# Patient Record
Sex: MALE | Race: WHITE | NOT HISPANIC OR LATINO | URBAN - METROPOLITAN AREA
[De-identification: names, ages, dates, MRNs, and addresses within clinical notes are randomized per-mention and may not be internally consistent; named-entity substitution may affect disease eponyms.]

---

## 2022-02-18 DIAGNOSIS — M51.16 LUMBAR DISC HERNIATION WITH RADICULOPATHY: Primary | ICD-10-CM

## 2022-02-18 DIAGNOSIS — M54.16 LUMBAR RADICULOPATHY: ICD-10-CM

## 2022-02-21 ENCOUNTER — CLINICAL SUPPORT (OUTPATIENT)
Dept: REHABILITATION | Facility: HOSPITAL | Age: 32
End: 2022-02-21
Payer: COMMERCIAL

## 2022-02-21 DIAGNOSIS — M54.50 LUMBAR PAIN WITH RADIATION DOWN RIGHT LEG: ICD-10-CM

## 2022-02-21 DIAGNOSIS — M79.604 LUMBAR PAIN WITH RADIATION DOWN RIGHT LEG: ICD-10-CM

## 2022-02-21 PROCEDURE — 97161 PT EVAL LOW COMPLEX 20 MIN: CPT | Mod: PN | Performed by: PHYSICAL THERAPIST

## 2022-02-21 PROCEDURE — 97110 THERAPEUTIC EXERCISES: CPT | Mod: PN | Performed by: PHYSICAL THERAPIST

## 2022-02-21 NOTE — PLAN OF CARE
[facility]  Physical Therapy Initial Evaluation    Date: 2/21/2022   Name: Shay Correa  Clinic Number: [id number]    Therapy Diagnosis:   Encounter Diagnosis   Name Primary?    Lumbar pain with radiation down right leg      Physician: Cezar Almonte MD    Physician Orders: PT Eval and Treat   Medical Diagnosis from Referral: Lumbar disc herniation with radiculopathy  Evaluation Date: 2/21/2022  Authorization Period Expiration: 12/31/2022  Plan of Care Expiration: 04/05/22  Progress Note Due: 03/21/22  Visit # / Visits authorized: 1/ 1   FOTO: 1/ 3     Precautions: Standard    Time In: 4:40 AM  Time Out: 5:25 AM  Total Appointment Time (timed & untimed codes): 45 minutes    Subjective   Date of onset: August 2021  History of current condition - Shay reports: An onset of of right lateral thigh and calf pain that started in August of 2021. He was seen by a chiropractor, but did not have a reduction in symptoms. He had an MRI that showed a disc herniation at L4-5. His pain is intermittent in nature and is improved with activity. His pain is provoked by position; sitting is worse then standing. He also has intermittent numbness and tingling in the lateral thigh and posterior calf.        No past medical history on file.  Shay Correa  has no past surgical history on file.    Shay currently has no medications in their medication list.    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   Prior Level of Function: No limitations related to back pain   Current Level of Function: Able to complete ADL's but with intermittent pain     Pain:  Current 2/10, worst 9/10, best 0/10   Location: right back  and right leg    Description: Sharp and muscle cramp   Aggravating Factors:  Sitting and positional   Easing Factors: walking, changing position     Pts goals: To decrease back pain       Objective   Red Flag Screening:   Cough  Sneeze  Strain: (+)  Bladder/ bowel: (--)  Falls: (--)  Night pain: (--)  Unexplained weight loss: (--)  General health: Good     Posture/ Alignment: Fair, T/L hinging     GAIT DEVIATIONS: Shay rawls with with normal gait mechanics .    ROM:   ROM:   AROM  Comment   Flexion: Moderate loss      Extension: WNL      Lat Flex R: WNL      Lat Flex L: WNL     Rotation R: WNL     Rotation L: WNL     *pain    Strength: Dermatomes:   Right Left Comment   L2 (lateral thigh) intact intact    L3 (medial thigh) intact intact    L4 (medial calf) intact intact    L5 (lateral calf) intact intact    S1 (lateral foot) intact intact    S2 (gastro/HS) intact intact    Saddle (cauda equina) intact intact      Myotomes:   Right Left Comment   Iliacus: (L2-3) 5/5 5/5    Knee extension (L3-4): 5/5 5/5    DF (L4-5): 5/5 5/5    Extensor digitorum longus, Peroneus(L5-S1): 5/5 5/5    Gastroc/Soleus(S1-S2): 5/5 5/5      DTR:   Right Left Comment   Patellar (L3-4) 2+ 2+    Achilles (S1) 2+ 2+        Special Tests:  REPEATED TEST MOVEMENTS:  Repeated Flexion in Standing Increased, no worse    Repeated Extension in Standing Increased, No worse    Repeated Flexion in lying NT   Repeated Extension in lying  abolished     - AUGUSTINE (-)  - SLR:(+)    Palpation:  TTP @ L5     Pt/family was provided educational information, including: role of PT, goals for PT, scheduling - pt verbalized understanding. Discussed insurance plan with pt.       CMS Impairment/Limitation/Restriction for FOTO Lumbar  Survey    Therapist reviewed FOTO scores for Shay Correa on 2/21/2022.   FOTO documents entered into [employer] - see Media section.    Limitation Score: 35%  Category: Mobility    Current : CJ = at least 20% but < 40% impaired, limited or restricted         TREATMENT   Treatment Time In: 5:15 PM   Treatment Time  Out: 5:25 PM   Total Treatment time separate from Evaluation: 10 minutes    Shay received therapeutic exercises to develop ROM for 10 minutes including:  HEP setup and instruction; handout issued   Prone press ups 2 x 10     Home Exercises and Patient Education Provided    Education provided:   - Pathophysiology of condition   - HEP   - POC   - Minimizing flexion based activities     Written Home Exercises Provided: yes.  Exercises were reviewed and Shay was able to demonstrate them prior to the end of the session.  Shay demonstrated good  understanding of the education provided.     See EMR under Patient Instructions for exercises provided 2/21/2022.      Assessment   Pt presents with a medical diagnosis of lumbar radiculopathy. Physical exam is consistent with mechanical lumbar pain and more specifically a lumbar derangement. His preliminary directional preference is extension. Primary impairments include AROM, joint mobility, strength,and pain which limits functional mobility. This pt is a good candidate for skilled PT tx and stands to benefit from a combination of manual therapy, therapeutic exercise, neuromuscular re-education, dry needling and modalities Prn. The pt has been educated on their dx/POC and consents to further PT tx.      Pt prognosis is Excellent.   Pt will benefit from skilled outpatient Physical Therapy to address the deficits stated above and in the chart below, provide pt/family education, and to maximize pt's level of independence.     Plan of care discussed with patient: Yes  Pt's spiritual, cultural and educational needs considered and patient is agreeable to the plan of care and goals as stated below:     Anticipated Barriers for therapy: Work schedule     Medical Necessity is demonstrated by the following  History  Co-morbidities and personal factors that may impact the plan of care Co-morbidities:   high BMI and HTN    Personal Factors:   lifestyle     moderate    Examination  Body Structures and Functions, activity limitations and participation restrictions that may impact the plan of care Body Regions:   back  lower extremities    Body Systems:    ROM  strength  motor control    Participation Restrictions:       Activity limitations:   Learning and applying knowledge  no deficits    General Tasks and Commands  no deficits    Communication  no deficits    Mobility  lifting and carrying objects    Self care  no deficits    Domestic Life  doing house work (cleaning house, washing dishes, laundry)    Interactions/Relationships  no deficits    Life Areas  no deficits    Community and Social Life  recreation and leisure         high   Clinical Presentation stable and uncomplicated low   Decision Making/ Complexity Score: low     Goals:  Short Term Goals: 3 weeks   1) Pt will be I with established HEP   2) Right LE pain will be resolved to allow for improved ease of ADL and exercise related activity   3) Lumbar pain will be no worse then 3/10     Long Term Goals: 6 weeks   1) Pt will return to exercise activities without limitations related to lumbar pain   2) Pt will perform all ADL activities without lumbar pain   3) Pt will have 10% or less limitation on the FOTO        Plan   Plan of care Certification: 2/21/2022 to 04/05/22.    Outpatient Physical Therapy 2 times weekly for 6 weeks to include the following interventions: Manual Therapy, Neuromuscular Re-ed, Patient Education, Therapeutic Exercise and dry needling .     Rishi Us, PT      I CERTIFY THE NEED FOR THESE SERVICES FURNISHED UNDER THIS PLAN OF TREATMENT AND WHILE UNDER MY CARE   Physician's comments:     Physician's Signature: ___________________________________________________

## 2022-02-22 PROBLEM — M79.604 LUMBAR PAIN WITH RADIATION DOWN RIGHT LEG: Status: ACTIVE | Noted: 2022-02-22

## 2022-02-22 PROBLEM — M54.50 LUMBAR PAIN WITH RADIATION DOWN RIGHT LEG: Status: ACTIVE | Noted: 2022-02-22

## 2022-02-23 NOTE — PROGRESS NOTES
Physical Therapy Daily Treatment Note     Name: Shay Correa  Clinic Number: [id number]    Therapy Diagnosis:   Encounter Diagnosis   Name Primary?    Lumbar pain with radiation down right leg Yes     Physician: Cezar Almonte MD    Visit Date: 2/24/2022  Physician Orders: PT Eval and Treat   Medical Diagnosis from Referral: Lumbar disc herniation with radiculopathy  Evaluation Date: 2/21/2022  Authorization Period Expiration: 12/31/2022  Plan of Care Expiration: 04/05/22  Progress Note Due: 03/21/22  Visit # / Visits authorized: 1/20 auth (1/1 eval)   FOTO: 1/ 3      Precautions: Standard     Time In: 1246 PM  Time Out: 0130 PM  Total Appointment Time (timed & untimed codes): 44 minutes    Subjective     Pt reports: he has noticed improvements with light exercises including prone press ups and recreational walking. He is only experiencing minimal radicular symptoms into right LE.     He was compliant with home exercise program.  Response to previous treatment: initial evaluation   Functional change: increased mobility    Pain: 2/10  Location: right back and right leg    Objective     Shay received therapeutic exercises to develop strength, endurance, ROM, flexibility, posture and core stabilization for 34 minutes including:    Upright bike 5 min  Long sitting hamstring stretch 2 min  LE dead bugs 2x10  Bridging x30  Planking 4x10 sec  Straight arm lat pulldown 3x10 20#  Seated lat pulldown 3x10 27#      Shay received the following manual therapy techniques: Joint mobilizations and Manual traction were applied to the: lumbar spine for 10 minutes, including:    Grade 3-4 lumbar PA mobilizations, pt in prone position  Manual traction, pt in supine, BLE      Possible next visit: manual techniques including joint mobilizations and dry needling, stabilization       Home Exercises Provided and Patient Education Provided     Education provided:   - HEP provided and reviewed  - Anatomy and Physiology  pertaining to current condition  - Possible response to exercise  - Importance of PT compliance    Written Home Exercises Provided: yes.  Exercises were reviewed and Shay was able to demonstrate them prior to the end of the session.  Shay demonstrated good  understanding of the education provided.      See EMR under Patient Instructions for exercises provided 2/21/2022.    Assessment     Pt tolerates all additions to therex with emphasis on lumbar stabilization, LE flexibility, and core strengthening. Pt notes positive response with improved mobility and slightly reduced symptoms following manual therapy. He would benefit from further progression and continuance.     Shay Is progressing well towards his goals.   Pt prognosis is Excellent.     Pt will continue to benefit from skilled outpatient physical therapy to address the deficits listed in the problem list box on initial evaluation, provide pt/family education and to maximize pt's level of independence in the home and community environment.     Pt's spiritual, cultural and educational needs considered and pt agreeable to plan of care and goals.    Anticipated barriers to physical therapy: none    Goals:  Short Term Goals: 3 weeks   1) Pt will be I with established HEP. (met)  2) Right LE pain will be resolved to allow for improved ease of ADL and exercise related activity . (progressing, not met)  3) Lumbar pain will be no worse then 3/10. (progressing, not met)     Long Term Goals: 6 weeks   1) Pt will return to exercise activities without limitations related to lumbar pain. (progressing, not met)  2) Pt will perform all ADL activities without lumbar pain. (progressing, not met)  3) Pt will have 10% or less limitation on the FOTO. (progressing, not met)    Plan     Continue with PT POC to address functional limitations and symptoms presented.     Jim Banks, PT DPT

## 2022-02-24 ENCOUNTER — CLINICAL SUPPORT (OUTPATIENT)
Dept: REHABILITATION | Facility: HOSPITAL | Age: 32
End: 2022-02-24
Payer: COMMERCIAL

## 2022-02-24 DIAGNOSIS — M54.50 LUMBAR PAIN WITH RADIATION DOWN RIGHT LEG: Primary | ICD-10-CM

## 2022-02-24 DIAGNOSIS — M79.604 LUMBAR PAIN WITH RADIATION DOWN RIGHT LEG: Primary | ICD-10-CM

## 2022-02-24 PROCEDURE — 97110 THERAPEUTIC EXERCISES: CPT | Mod: PN

## 2022-02-24 PROCEDURE — 97140 MANUAL THERAPY 1/> REGIONS: CPT | Mod: PN

## 2022-03-02 ENCOUNTER — CLINICAL SUPPORT (OUTPATIENT)
Dept: REHABILITATION | Facility: HOSPITAL | Age: 32
End: 2022-03-02
Payer: COMMERCIAL

## 2022-03-02 DIAGNOSIS — M54.50 LUMBAR PAIN WITH RADIATION DOWN RIGHT LEG: Primary | ICD-10-CM

## 2022-03-02 DIAGNOSIS — M79.604 LUMBAR PAIN WITH RADIATION DOWN RIGHT LEG: Primary | ICD-10-CM

## 2022-03-02 PROCEDURE — 97140 MANUAL THERAPY 1/> REGIONS: CPT | Mod: PN

## 2022-03-02 PROCEDURE — 97110 THERAPEUTIC EXERCISES: CPT | Mod: PN

## 2022-03-02 NOTE — PROGRESS NOTES
Physical Therapy Daily Treatment Note     Name: Shay Correa  Clinic Number: [id number]    Therapy Diagnosis:   Encounter Diagnosis   Name Primary?    Lumbar pain with radiation down right leg Yes     Physician: Cezar Almonte MD    Visit Date: 3/2/2022  Physician Orders: PT Eval and Treat   Medical Diagnosis from Referral: Lumbar disc herniation with radiculopathy  Evaluation Date: 2/21/2022  Authorization Period Expiration: 12/31/2022  Plan of Care Expiration: 04/05/22  Progress Note Due: 03/21/22  Visit # / Visits authorized: 1/20 auth (1/1 eval)   FOTO: 1/ 3      Precautions: Standard     Time In: 0440  Time Out: 0515 PM  Total Appointment Time (timed & untimed codes): 35 minutes    Subjective     Pt reports: symptoms continue to improve.     He was compliant with home exercise program.  Response to previous treatment: initial evaluation   Functional change: increased mobility    Pain: 1/10  Location: right back and right leg    Objective     Shay received therapeutic exercises to develop strength, endurance, ROM, flexibility, posture and core stabilization for 25 minutes including:    Upright bike 5 min  Long sitting hamstring stretch 2 min  LE dead bugs 2x10  Bridging x30  Planking 4x10 sec  Straight arm lat pulldown 3x10 20#  Seated lat pulldown 3x10 27#  Open books x10 bilateral       Pt received Manual Therapy techniques in the form of Dry Needling for 10 minutes. This was applied to the low back right side. Dry needling was performed to decrease inflammation, increase circulation, decrease pain and restore homeostasis.     Electrical Stimulation was applied while needles were in situ for 5 minutes. Intensity increased to patient tolerance. No adverse reactions observed.      (4) 60 mm needles with 0.30 mm gauge were used for all insertion points. Pt in prone position.     Patient gave Verbal and Written consent to undergo dry needling. Written consent can be found in the media section in pts  chart. All needles were removed and changes in signs and symptoms were assessed. No adverse reactions noted at the conclusion of the treatment.             Possible next visit: manual techniques including joint mobilizations and dry needling, stabilization       Home Exercises Provided and Patient Education Provided     Education provided:   - HEP provided and reviewed  - Anatomy and Physiology pertaining to current condition  - Possible response to exercise  - Importance of PT compliance    Written Home Exercises Provided: yes.  Exercises were reviewed and Shay was able to demonstrate them prior to the end of the session.  Shay demonstrated good  understanding of the education provided.      See EMR under Patient Instructions for exercises provided 2/21/2022.    Assessment     Pt tolerates open book exercise for back mobility specifically more in thoracic area.  Dry needling performed to reduce discomfort and reduce radicular symptoms into LE. He notes overall positive response immediately following treatment. Continue PT.     Shay Is progressing well towards his goals.   Pt prognosis is Excellent.     Pt will continue to benefit from skilled outpatient physical therapy to address the deficits listed in the problem list box on initial evaluation, provide pt/family education and to maximize pt's level of independence in the home and community environment.     Pt's spiritual, cultural and educational needs considered and pt agreeable to plan of care and goals.    Anticipated barriers to physical therapy: none    Goals:  Short Term Goals: 3 weeks   1) Pt will be I with established HEP. (met)  2) Right LE pain will be resolved to allow for improved ease of ADL and exercise related activity . (progressing, not met)  3) Lumbar pain will be no worse then 3/10. (progressing, not met)     Long Term Goals: 6 weeks   1) Pt will return to exercise activities without limitations related to lumbar pain. (progressing,  not met)  2) Pt will perform all ADL activities without lumbar pain. (progressing, not met)  3) Pt will have 10% or less limitation on the FOTO. (progressing, not met)    Plan     Continue with PT POC to address functional limitations and symptoms presented.     Jim Banks, PT DPT

## 2022-03-03 ENCOUNTER — CLINICAL SUPPORT (OUTPATIENT)
Dept: REHABILITATION | Facility: HOSPITAL | Age: 32
End: 2022-03-03
Payer: COMMERCIAL

## 2022-03-03 DIAGNOSIS — M54.50 LUMBAR PAIN WITH RADIATION DOWN RIGHT LEG: Primary | ICD-10-CM

## 2022-03-03 DIAGNOSIS — M79.604 LUMBAR PAIN WITH RADIATION DOWN RIGHT LEG: Primary | ICD-10-CM

## 2022-03-03 PROCEDURE — 97110 THERAPEUTIC EXERCISES: CPT | Mod: PN | Performed by: PHYSICAL THERAPIST

## 2022-03-03 NOTE — PROGRESS NOTES
Physical Therapy Daily Treatment Note     Name: Shay Correa  Clinic Number: [id number]    Therapy Diagnosis:   Encounter Diagnosis   Name Primary?    Lumbar pain with radiation down right leg Yes     Physician: Cezar Almonte MD    Visit Date: 3/3/2022  Physician Orders: PT Eval and Treat   Medical Diagnosis from Referral: Lumbar disc herniation with radiculopathy  Evaluation Date: 2/21/2022  Authorization Period Expiration: 12/31/2022  Plan of Care Expiration: 04/05/22  Progress Note Due: 03/21/22  Visit # / Visits authorized: 3/20 auth (1/1 eval)   FOTO: 1/ 3      Precautions: Standard     Time In: 7:10 AM  Time Out: 7:50 AM  Total Appointment Time (timed & untimed codes): 40 minutes    Subjective     Pt reports: that he had an increase in pain after attending [employer] last night, but is better this morning. I am having some pain in my upper back and between my shoulder blades     He was compliant with home exercise program.  Response to previous treatment: initial evaluation   Functional change: increased mobility    Pain: 1/10  Location: right back and right leg    Objective     Shay received therapeutic exercises to develop strength, endurance, ROM, flexibility, posture and core stabilization for 40 minutes including:    Upright bike 5 min  Long sitting hamstring stretch 2 min  Prone press ups 2 x 10   LE dead bugs 2x10  SL Bridging x30  Planking 4x10 sec  Straight arm lat pulldown 3x10 20#  Seated lat pulldown 3x10 27#  Open books x10 bilateral   Clamshells 3 x 10 B red theraband     Pt received Manual Therapy techniques in the form of Dry Needling for  minutes. This was applied to the low back right side. Dry needling was performed to decrease inflammation, increase circulation, decrease pain and restore homeostasis.     Electrical Stimulation was applied while needles were in situ for  minutes. Intensity increased to patient tolerance. No adverse reactions observed.      (4) 60 mm needles with  0.30 mm gauge were used for all insertion points. Pt in prone position.           Possible next visit: manual techniques including joint mobilizations and dry needling, stabilization       Home Exercises Provided and Patient Education Provided     Education provided:   - HEP provided and reviewed  - Anatomy and Physiology pertaining to current condition  - Possible response to exercise  - Importance of PT compliance    Written Home Exercises Provided: yes.  Exercises were reviewed and Shay was able to demonstrate them prior to the end of the session.  Shay demonstrated good  understanding of the education provided.      See EMR under Patient Instructions for exercises provided 2/21/2022.    Assessment     Pt with exacerbation of symptoms likely caused by repetitive sit to stand transitions. Otherwise, his symptoms have been reducing. Improved ease of lumbar extension in standing. He will benefit from continued performance of extension based exercises to assist in further reducing symptoms. Upper back and periscapular pain likely radiating from the neck.     Shay Is progressing well towards his goals.   Pt prognosis is Excellent.     Pt will continue to benefit from skilled outpatient physical therapy to address the deficits listed in the problem list box on initial evaluation, provide pt/family education and to maximize pt's level of independence in the home and community environment.     Pt's spiritual, cultural and educational needs considered and pt agreeable to plan of care and goals.    Anticipated barriers to physical therapy: none    Goals:  Short Term Goals: 3 weeks   1) Pt will be I with established HEP. (met)  2) Right LE pain will be resolved to allow for improved ease of ADL and exercise related activity . (progressing, not met)  3) Lumbar pain will be no worse then 3/10. (progressing, not met)     Long Term Goals: 6 weeks   1) Pt will return to exercise activities without limitations related to  lumbar pain. (progressing, not met)  2) Pt will perform all ADL activities without lumbar pain. (progressing, not met)  3) Pt will have 10% or less limitation on the FOTO. (progressing, not met)    Plan     Continue with PT POC to address functional limitations and symptoms presented.     Rishi Us, PT DPT

## 2022-03-07 ENCOUNTER — CLINICAL SUPPORT (OUTPATIENT)
Dept: REHABILITATION | Facility: HOSPITAL | Age: 32
End: 2022-03-07
Payer: COMMERCIAL

## 2022-03-07 DIAGNOSIS — M54.50 LUMBAR PAIN WITH RADIATION DOWN RIGHT LEG: Primary | ICD-10-CM

## 2022-03-07 DIAGNOSIS — M79.604 LUMBAR PAIN WITH RADIATION DOWN RIGHT LEG: Primary | ICD-10-CM

## 2022-03-07 PROCEDURE — 97110 THERAPEUTIC EXERCISES: CPT | Mod: PN

## 2022-03-07 NOTE — PROGRESS NOTES
Physical Therapy Daily Treatment Note     Name: Shay Correa  Clinic Number: [id number]    Therapy Diagnosis:   Encounter Diagnosis   Name Primary?    Lumbar pain with radiation down right leg Yes     Physician: Cezar Almonte MD    Visit Date: 3/7/2022  Physician Orders: PT Eval and Treat   Medical Diagnosis from Referral: Lumbar disc herniation with radiculopathy  Evaluation Date: 2/21/2022  Authorization Period Expiration: 12/31/2022  Plan of Care Expiration: 04/05/22  Progress Note Due: 03/21/22  Visit # / Visits authorized: 4/20 auth (1/1 eval)   FOTO: 1/ 3      Precautions: Standard     Time In: 0433  Time Out: 0512  Total Appointment Time (timed & untimed codes): 40 minutes    Subjective     Pt reports: he has felt better in recent days. He continues to progress his daily activities and exercises. He feels better when he is moving throughout the day.     He was compliant with home exercise program.  Response to previous treatment: initial evaluation   Functional change: increased mobility    Pain: 1/10  Location: right back and right leg    Objective     Shay received therapeutic exercises to develop strength, endurance, ROM, flexibility, posture and core stabilization for 40 minutes including:    Upright bike 5 min  Long sitting hamstring stretch 2 min  Cable rows 60#  Pallof press with cable 13# x20 each  Standing lumbar extension, against table x20 reps    LE dead bugs 2x10  SL Bridging x30  Planking 4x10 sec  Straight arm lat pulldown 3x10 20#  Seated lat pulldown 3x10 30#  Open books x10 bilateral   Clamshells 3 x 10 B red theraband             Possible next visit: traction      Home Exercises Provided and Patient Education Provided     Education provided:   - HEP provided and reviewed  - Anatomy and Physiology pertaining to current condition  - Possible response to exercise  - Importance of PT compliance    Written Home Exercises Provided: yes.  Exercises were reviewed and Shay was able  to demonstrate them prior to the end of the session.  Shay demonstrated good  understanding of the education provided.      See EMR under Patient Instructions for exercises provided 2/21/2022.    Assessment     Pt tolerates therex progression including pallof press, cable rows, and standing lumbar extension. His symptoms continue to improve and he is making progress toward goals. Continue PT with possible mechanical traction next visit.    Shay Is progressing well towards his goals.   Pt prognosis is Excellent.     Pt will continue to benefit from skilled outpatient physical therapy to address the deficits listed in the problem list box on initial evaluation, provide pt/family education and to maximize pt's level of independence in the home and community environment.     Pt's spiritual, cultural and educational needs considered and pt agreeable to plan of care and goals.    Anticipated barriers to physical therapy: none    Goals:  Short Term Goals: 3 weeks   1) Pt will be I with established HEP. (met)  2) Right LE pain will be resolved to allow for improved ease of ADL and exercise related activity . (progressing, not met)  3) Lumbar pain will be no worse then 3/10. (progressing, not met)     Long Term Goals: 6 weeks   1) Pt will return to exercise activities without limitations related to lumbar pain. (progressing, not met)  2) Pt will perform all ADL activities without lumbar pain. (progressing, not met)  3) Pt will have 10% or less limitation on the FOTO. (progressing, not met)    Plan     Continue with PT POC to address functional limitations and symptoms presented.     Jim Banks, PT DPT

## 2022-03-09 ENCOUNTER — CLINICAL SUPPORT (OUTPATIENT)
Dept: REHABILITATION | Facility: HOSPITAL | Age: 32
End: 2022-03-09
Payer: COMMERCIAL

## 2022-03-09 DIAGNOSIS — M79.604 LUMBAR PAIN WITH RADIATION DOWN RIGHT LEG: Primary | ICD-10-CM

## 2022-03-09 DIAGNOSIS — M54.50 LUMBAR PAIN WITH RADIATION DOWN RIGHT LEG: Primary | ICD-10-CM

## 2022-03-09 PROCEDURE — 97012 MECHANICAL TRACTION THERAPY: CPT | Mod: PN

## 2022-03-09 PROCEDURE — 97110 THERAPEUTIC EXERCISES: CPT | Mod: PN

## 2022-03-09 NOTE — PROGRESS NOTES
Physical Therapy Daily Treatment Note     Name: Shay Correa  Clinic Number: [id number]    Therapy Diagnosis:   Encounter Diagnosis   Name Primary?    Lumbar pain with radiation down right leg Yes     Physician: Cezar Almonte MD    Visit Date: 3/9/2022  Physician Orders: PT Eval and Treat   Medical Diagnosis from Referral: Lumbar disc herniation with radiculopathy  Evaluation Date: 2/21/2022  Authorization Period Expiration: 12/31/2022  Plan of Care Expiration: 04/05/22  Progress Note Due: 03/21/22  Visit # / Visits authorized: 5/20 auth (1/1 eval)   FOTO: 2/ 3      Precautions: Standard     Time In: 0430  Time Out: 0510  Total Appointment Time (timed & untimed codes): 40 minutes    Subjective     Pt reports: increased tightness this morning but has improved throughout the day. Symptoms are still improved overall in the last week.     He was compliant with home exercise program.  Response to previous treatment: reduced symptoms  Functional change: increased mobility    Pain: 1/10  Location: right back and right leg    Objective     Shay received therapeutic exercises to develop strength, endurance, ROM, flexibility, posture and core stabilization for 35 minutes including:    Upright bike 5 min  Long sitting hamstring stretch 2 min  Cable rows 60#  Straight arm lat pulldown 3x10 20#  Seated lat pulldown 3x10 30#  Pallof press with cable 13# x20 each  Standing lumbar extension, against table x20 reps      LE dead bugs 2x10  SL Bridging x30  Planking 4x10 sec  Open books x10 bilateral   Clamshells 3 x 10 B red theraband       Shay received the following supervised modalities after being cleared for contradictions: Mechanical Traction:  Shay received intermittent mechanical traction to the lumbar spine at a force of 65 pounds for a total of 5 minutes. Hold time of 45 seconds and rest time for 15 seconds. Patient tolerated treatment well without any adverse effects.      Possible next visit:  traction      Home Exercises Provided and Patient Education Provided     Education provided:   - HEP provided and reviewed  - Anatomy and Physiology pertaining to current condition  - Possible response to exercise  - Importance of PT compliance    Written Home Exercises Provided: yes.  Exercises were reviewed and Shay was able to demonstrate them prior to the end of the session.  Shay demonstrated good  understanding of the education provided.      See EMR under Patient Instructions for exercises provided 2/21/2022.    Assessment     Pt continues strengthening activities or core and lower back region. He is able to tolerate progression of therex including increased cable weight. Lumbar traction performed today with no adverse symptoms. Continue PT POC as planned.     Shay Is progressing well towards his goals.   Pt prognosis is Excellent.     Pt will continue to benefit from skilled outpatient physical therapy to address the deficits listed in the problem list box on initial evaluation, provide pt/family education and to maximize pt's level of independence in the home and community environment.     Pt's spiritual, cultural and educational needs considered and pt agreeable to plan of care and goals.    Anticipated barriers to physical therapy: none    Goals:  Short Term Goals: 3 weeks   1) Pt will be I with established HEP. (met)  2) Right LE pain will be resolved to allow for improved ease of ADL and exercise related activity . (progressing, not met)  3) Lumbar pain will be no worse then 3/10. (progressing, not met)     Long Term Goals: 6 weeks   1) Pt will return to exercise activities without limitations related to lumbar pain. (progressing, not met)  2) Pt will perform all ADL activities without lumbar pain. (progressing, not met)  3) Pt will have 10% or less limitation on the FOTO. (progressing, not met)    Plan     Continue with PT POC to address functional limitations and symptoms presented.     Jim  Jocelyn, PT DPT

## 2022-03-14 ENCOUNTER — CLINICAL SUPPORT (OUTPATIENT)
Dept: REHABILITATION | Facility: HOSPITAL | Age: 32
End: 2022-03-14
Payer: COMMERCIAL

## 2022-03-14 DIAGNOSIS — M54.50 LUMBAR PAIN WITH RADIATION DOWN RIGHT LEG: Primary | ICD-10-CM

## 2022-03-14 DIAGNOSIS — M79.604 LUMBAR PAIN WITH RADIATION DOWN RIGHT LEG: Primary | ICD-10-CM

## 2022-03-14 PROCEDURE — 97110 THERAPEUTIC EXERCISES: CPT | Mod: PN

## 2022-03-14 NOTE — PROGRESS NOTES
Physical Therapy Daily Treatment Note     Name: Shay Correa  Clinic Number: [id number]    Therapy Diagnosis:   Encounter Diagnosis   Name Primary?    Lumbar pain with radiation down right leg Yes     Physician: Cezar Almonte MD    Visit Date: 3/14/2022  Physician Orders: PT Eval and Treat   Medical Diagnosis from Referral: Lumbar disc herniation with radiculopathy  Evaluation Date: 2/21/2022  Authorization Period Expiration: 12/31/2022  Plan of Care Expiration: 04/05/22  Progress Note Due: 03/21/22  Visit # / Visits authorized: 6/20 auth (1/1 eval)   FOTO: 2/ 3      Precautions: Standard     Time In: 0435  Time Out: 0515  Total Appointment Time (timed & untimed codes): 40 minutes    Subjective     Pt reports: his symptoms were fine after last session when mechanical traction was performed.     He was compliant with home exercise program.  Response to previous treatment: reduced symptoms  Functional change: increased mobility    Pain: 1/10  Location: right back and right leg    Objective     Shay received therapeutic exercises to develop strength, endurance, ROM, flexibility, posture and core stabilization for 40 minutes including:    Upright bike 5 min  Bilateral shuttle 3.5 bands x30  Single leg shuttle 2.5 x20 each  Shuttle hip extension 0.5 band x20 each  Long sitting hamstring stretch 2 min  Cable rows 60#  Straight arm lat pulldown 3x10 23#  Seated lat pulldown 3x10 30#  Pallof press with cable 13# x20 each  Standing lumbar extension, against table x20 reps  Prone press ups 2 x 10   LE dead bugs 2x10  SL Bridging x10  Planking 4x10 sec  Open books x10 bilateral   Clamshells 3 x 10 B red theraband       Home Exercises Provided and Patient Education Provided     Education provided:   - HEP provided and reviewed  - Anatomy and Physiology pertaining to current condition  - Possible response to exercise  - Importance of PT compliance    Written Home Exercises Provided: yes.  Exercises were reviewed and  Shay was able to demonstrate them prior to the end of the session.  Shay demonstrated good  understanding of the education provided.      See EMR under Patient Instructions for exercises provided 2/21/2022.    Assessment     Pt tolerates additional therex today including LE strengthening with shuttle without low back compensation. Manual techniques and traction not performed due to patient having a chiropractor appointment immediately following PT. He would benefit from continuance of PT at this time.      Shay Is progressing well towards his goals.   Pt prognosis is Excellent.     Pt will continue to benefit from skilled outpatient physical therapy to address the deficits listed in the problem list box on initial evaluation, provide pt/family education and to maximize pt's level of independence in the home and community environment.     Pt's spiritual, cultural and educational needs considered and pt agreeable to plan of care and goals.    Anticipated barriers to physical therapy: none    Goals:  Short Term Goals: 3 weeks   1) Pt will be I with established HEP. (met)  2) Right LE pain will be resolved to allow for improved ease of ADL and exercise related activity . (progressing, not met)  3) Lumbar pain will be no worse then 3/10. (progressing, not met)     Long Term Goals: 6 weeks   1) Pt will return to exercise activities without limitations related to lumbar pain. (progressing, not met)  2) Pt will perform all ADL activities without lumbar pain. (progressing, not met)  3) Pt will have 10% or less limitation on the FOTO. (progressing, not met)    Plan     Continue with PT POC to address functional limitations and symptoms presented.     Jim Banks, PT DPT

## 2022-03-16 ENCOUNTER — CLINICAL SUPPORT (OUTPATIENT)
Dept: REHABILITATION | Facility: HOSPITAL | Age: 32
End: 2022-03-16
Payer: COMMERCIAL

## 2022-03-16 DIAGNOSIS — M54.50 LUMBAR PAIN WITH RADIATION DOWN RIGHT LEG: Primary | ICD-10-CM

## 2022-03-16 DIAGNOSIS — M79.604 LUMBAR PAIN WITH RADIATION DOWN RIGHT LEG: Primary | ICD-10-CM

## 2022-03-16 PROCEDURE — 97110 THERAPEUTIC EXERCISES: CPT | Mod: PN

## 2022-03-16 PROCEDURE — 97140 MANUAL THERAPY 1/> REGIONS: CPT | Mod: PN

## 2022-03-16 NOTE — PROGRESS NOTES
Physical Therapy Daily Treatment Note     Name: Shay Correa  Clinic Number: [id number]    Therapy Diagnosis:   Encounter Diagnosis   Name Primary?    Lumbar pain with radiation down right leg Yes     Physician: Cezar Almonte MD    Visit Date: 3/16/2022  Physician Orders: PT Eval and Treat   Medical Diagnosis from Referral: Lumbar disc herniation with radiculopathy  Evaluation Date: 2/21/2022  Authorization Period Expiration: 12/31/2022  Plan of Care Expiration: 04/05/22  Progress Note Due: 03/21/22  Visit # / Visits authorized: 7/20 auth (1/1 eval)   FOTO: 2/ 3      Precautions: Standard     Time In: 0430  Time Out: 0510  Total Appointment Time (timed & untimed codes): 40 minutes    Subjective     Pt reports: he has increased tightness to glute area but overall his back is doing well today.     He was compliant with home exercise program.  Response to previous treatment: reduced symptoms  Functional change: increased mobility    Pain: 1/10  Location: right back and right leg    Objective     Shay received therapeutic exercises to develop strength, endurance, ROM, flexibility, posture and core stabilization for 32 minutes including:    Upright bike 5 min  Bilateral shuttle 3.5 bands x30  Single leg shuttle 2.5 x20 each  Shuttle hip extension 0.5 band x20 each  Long sitting hamstring stretch 2 min  Cable rows 60#  Straight arm lat pulldown 3x10 23#  Seated lat pulldown 3x10 30#  Pallof press with cable 13# x20 each  Standing lumbar extension, against table x20 reps  Prone press ups 2 x 10   LE dead bugs 2x10    Planking 4x10 sec  Open books x10 bilateral   Clamshells 3 x 10 B red theraband  QL alternate arm/leg with cable 3#        Shay received the following manual therapy techniques: Soft tissue Mobilization were applied to the: lumbar and right glute for 8 minutes, including:    Percussion gun STM, pt in prone      Home Exercises Provided and Patient Education Provided     Education provided:   -  HEP provided and reviewed  - Anatomy and Physiology pertaining to current condition  - Possible response to exercise  - Importance of PT compliance    Written Home Exercises Provided: yes.  Exercises were reviewed and Shay was able to demonstrate them prior to the end of the session.  Shay demonstrated good  understanding of the education provided.      See EMR under Patient Instructions for exercises provided 2/21/2022.    Assessment     Pt performs all therex prescribed with min fatigue and no increase in radicular symptoms. He states that activity tolerance is improved and he does not have to reposition while at home anymore. Continue PT POC.     Shay Is progressing well towards his goals.   Pt prognosis is Excellent.     Pt will continue to benefit from skilled outpatient physical therapy to address the deficits listed in the problem list box on initial evaluation, provide pt/family education and to maximize pt's level of independence in the home and community environment.     Pt's spiritual, cultural and educational needs considered and pt agreeable to plan of care and goals.    Anticipated barriers to physical therapy: none    Goals:  Short Term Goals: 3 weeks   1) Pt will be I with established HEP. (met)  2) Right LE pain will be resolved to allow for improved ease of ADL and exercise related activity . (progressing, not met)  3) Lumbar pain will be no worse then 3/10. (progressing, not met)     Long Term Goals: 6 weeks   1) Pt will return to exercise activities without limitations related to lumbar pain. (progressing, not met)  2) Pt will perform all ADL activities without lumbar pain. (progressing, not met)  3) Pt will have 10% or less limitation on the FOTO. (progressing, not met)    Plan     Continue with PT POC to address functional limitations and symptoms presented.     Jim Banks, PT DPT

## 2022-03-21 ENCOUNTER — CLINICAL SUPPORT (OUTPATIENT)
Dept: REHABILITATION | Facility: HOSPITAL | Age: 32
End: 2022-03-21
Payer: COMMERCIAL

## 2022-03-21 DIAGNOSIS — M54.50 LUMBAR PAIN WITH RADIATION DOWN RIGHT LEG: Primary | ICD-10-CM

## 2022-03-21 DIAGNOSIS — M79.604 LUMBAR PAIN WITH RADIATION DOWN RIGHT LEG: Primary | ICD-10-CM

## 2022-03-21 PROCEDURE — 97110 THERAPEUTIC EXERCISES: CPT | Mod: PN

## 2022-03-23 ENCOUNTER — CLINICAL SUPPORT (OUTPATIENT)
Dept: REHABILITATION | Facility: HOSPITAL | Age: 32
End: 2022-03-23
Payer: COMMERCIAL

## 2022-03-23 DIAGNOSIS — M79.604 LUMBAR PAIN WITH RADIATION DOWN RIGHT LEG: Primary | ICD-10-CM

## 2022-03-23 DIAGNOSIS — M54.50 LUMBAR PAIN WITH RADIATION DOWN RIGHT LEG: Primary | ICD-10-CM

## 2022-03-23 PROCEDURE — 97110 THERAPEUTIC EXERCISES: CPT | Mod: PN

## 2022-03-23 NOTE — PLAN OF CARE
[facility]   Physical Therapy Treatment Note and Reassessment    Name: Shay Correa  Clinic Number: [id number]    Therapy Diagnosis:   Encounter Diagnosis   Name Primary?    Lumbar pain with radiation down right leg Yes     Physician: Cezar Almonte MD    Visit Date: 3/23/2022    Physician Orders: PT Eval and Treat   Medical Diagnosis from Referral: Lumbar disc herniation with radiculopathy  Evaluation Date: 2/21/2022  Authorization Period Expiration: 12/31/2022  Plan of Care Expiration: 04/05/22  Progress Note Due: 04/23/22  Visit # / Visits authorized: 9/20 auth (1/1 eval)   FOTO: 2/ 3      Precautions: Standard    PTA Visit #: 0/5     Time In: 0440 (Pt arrived late)  Time Out: 0515  Total Billable Time: 35 minutes    SUBJECTIVE     Pt reports: no new complaints today.      He was compliant with home exercise program.  Response to previous treatment: reduced symptoms  Functional change: increased mobility and strength     Pain: 0/10  Location: right back and right leg    OBJECTIVE     Red Flag Screening:   Cough  Sneeze  Strain: (+)  Bladder/ bowel: (--)  Falls: (--)  Night pain: (--)  Unexplained weight loss: (--)  General health: Good      Posture/ Alignment: Fair, T/L hinging      GAIT DEVIATIONS: Shay amb with with normal gait mechanics .     ROM:   ROM:   AROM   Comment   Flexion: 90%      Extension: WNL       Lat Flex R: WNL       Lat Flex L: WNL      Rotation R: WNL      Rotation L: WNL      *pain     Strength: Dermatomes:    Right Left Comment   L2 (lateral thigh) intact intact     L3 (medial thigh) intact intact     L4 (medial calf) intact intact     L5 (lateral calf) intact intact     S1 (lateral foot) intact intact     S2 (gastro/HS) intact intact     Saddle (cauda equina) intact intact        Myotomes:    Right Left Comment   Iliacus: (L2-3) 5/5 5/5     Knee extension (L3-4): 5/5 5/5     DF (L4-5): 5/5 5/5     Extensor digitorum longus, Peroneus(L5-S1): 5/5 5/5      Gastroc/Soleus(S1-S2): 5/5 5/5        DTR:    Right Left Comment   Patellar (L3-4) 2+ 2+     Achilles (S1) 2+ 2+           Special Tests:  REPEATED TEST MOVEMENTS:  Repeated Flexion in Standing Increased, no worse    Repeated Extension in Standing Increased, No worse    Repeated Flexion in lying NT   Repeated Extension in lying  abolished      - AUGUSTINE (-)  - SLR:(-)     Palpation:  TTP @ L5, minimal, has improved since IE     Pt/family was provided educational information, including: role of PT, goals for PT, scheduling - pt verbalized understanding. Discussed insurance plan with pt.       Objective Measures updated at progress report unless specified.     Treatment     Shay received the treatments listed below:      Shay received therapeutic exercises to develop strength, endurance, ROM, flexibility, posture and core stabilization for 35 minutes including:    (Remaining therex time used for reassessment purposes)    Upright bike 5 min  Bilateral shuttle 3.5 bands x30  Single leg shuttle 2.5 x20 each  Shuttle hip extension 0.5 band x20 each  Long sitting hamstring stretch 2 min  Cable rows 60# x20  Straight arm lat pulldown 3x10 23#  Seated lat pulldown 3x10 30#  Pallof press with cable 13# x20 each  Standing lumbar extension, against table x20 reps  QL alternate arm/leg with cable 3#  Bird dog x20  LE dead bugs 2x10  Planking 4x10 sec  Open books x10 bilateral             Patient Education and Home Exercises     Home Exercises Provided and Patient Education Provided     Education provided:   - HEP provided and reviewed  - Anatomy and Physiology pertaining to current condition  - Possible response to exercise  - Importance of PT compliance    Written Home Exercises Provided: yes.  Exercises were reviewed and Shay was able to demonstrate them prior to the end of the session.  Shay demonstrated good  understanding of the education provided.      See EMR under Patient Instructions for exercises provided  2/21/2022.    ASSESSMENT     Upon assessment, pt demonstrates improved core strength, LE flexibility, posture, and lumbar ROM. There are slight limitations that remain in right hamstring length and lumbar flexion. Radicular symptoms into RLE, stiffness in lumbar spine, and sitting tolerance have all improved since beginning PT. He would benefit from continuance of PT to address remaining deficits and begin transition to updated HEP.      Shay Is progressing well towards his goals.   Pt prognosis is Excellent.     Pt will continue to benefit from skilled outpatient physical therapy to address the deficits listed in the problem list box on initial evaluation, provide pt/family education and to maximize pt's level of independence in the home and community environment.     Pt's spiritual, cultural and educational needs considered and pt agreeable to plan of care and goals.    Anticipated barriers to physical therapy: none    Goals:  Short Term Goals: 3 weeks   1) Pt will be I with established HEP. (met)  2) Right LE pain will be resolved to allow for improved ease of ADL and exercise related activity . (met)  3) Lumbar pain will be no worse then 3/10. (met)     Long Term Goals: 6 weeks   1) Pt will return to exercise activities without limitations related to lumbar pain. (progressing, not met)  2) Pt will perform all ADL activities without lumbar pain. (met)  3) Pt will have 10% or less limitation on the FOTO. (progressing, not met)    PLAN     Continue with PT POC to address functional limitations and symptoms presented. DC in 1-2 weeks.     Jim Banks, PT DPT

## 2022-03-23 NOTE — PROGRESS NOTES
[facility]   Physical Therapy Treatment Note and Reassessment    Name: Shay Correa  Clinic Number: [id number]    Therapy Diagnosis:   Encounter Diagnosis   Name Primary?    Lumbar pain with radiation down right leg Yes     Physician: Cezar Almonte MD    Visit Date: 3/23/2022    Physician Orders: PT Eval and Treat   Medical Diagnosis from Referral: Lumbar disc herniation with radiculopathy  Evaluation Date: 2/21/2022  Authorization Period Expiration: 12/31/2022  Plan of Care Expiration: 04/05/22  Progress Note Due: 04/23/22  Visit # / Visits authorized: 9/20 auth (1/1 eval)   FOTO: 2/ 3      Precautions: Standard    PTA Visit #: 0/5     Time In: 0440 (Pt arrived late)  Time Out: 0515  Total Billable Time: 35 minutes    SUBJECTIVE     Pt reports: no new complaints today.      He was compliant with home exercise program.  Response to previous treatment: reduced symptoms  Functional change: increased mobility and strength     Pain: 0/10  Location: right back and right leg    OBJECTIVE     Red Flag Screening:   Cough  Sneeze  Strain: (+)  Bladder/ bowel: (--)  Falls: (--)  Night pain: (--)  Unexplained weight loss: (--)  General health: Good      Posture/ Alignment: Fair, T/L hinging      GAIT DEVIATIONS: Shay amb with with normal gait mechanics .     ROM:   ROM:   AROM   Comment   Flexion: 90%      Extension: WNL       Lat Flex R: WNL       Lat Flex L: WNL      Rotation R: WNL      Rotation L: WNL      *pain     Strength: Dermatomes:    Right Left Comment   L2 (lateral thigh) intact intact     L3 (medial thigh) intact intact     L4 (medial calf) intact intact     L5 (lateral calf) intact intact     S1 (lateral foot) intact intact     S2 (gastro/HS) intact intact     Saddle (cauda equina) intact intact        Myotomes:    Right Left Comment   Iliacus: (L2-3) 5/5 5/5     Knee extension (L3-4): 5/5 5/5     DF (L4-5): 5/5 5/5     Extensor digitorum longus, Peroneus(L5-S1): 5/5 5/5      Gastroc/Soleus(S1-S2): 5/5 5/5        DTR:    Right Left Comment   Patellar (L3-4) 2+ 2+     Achilles (S1) 2+ 2+           Special Tests:  REPEATED TEST MOVEMENTS:  Repeated Flexion in Standing Increased, no worse    Repeated Extension in Standing Increased, No worse    Repeated Flexion in lying NT   Repeated Extension in lying  abolished      - AUGUSTINE (-)  - SLR:(-)     Palpation:  TTP @ L5, minimal, has improved since IE     Pt/family was provided educational information, including: role of PT, goals for PT, scheduling - pt verbalized understanding. Discussed insurance plan with pt.       Objective Measures updated at progress report unless specified.     Treatment     Shay received the treatments listed below:      Shay received therapeutic exercises to develop strength, endurance, ROM, flexibility, posture and core stabilization for 35 minutes including:    (Remaining therex time used for reassessment purposes)    Upright bike 5 min  Bilateral shuttle 3.5 bands x30  Single leg shuttle 2.5 x20 each  Shuttle hip extension 0.5 band x20 each  Long sitting hamstring stretch 2 min  Cable rows 60# x20  Straight arm lat pulldown 3x10 23#  Seated lat pulldown 3x10 30#  Pallof press with cable 13# x20 each  Standing lumbar extension, against table x20 reps  QL alternate arm/leg with cable 3#  Bird dog x20  LE dead bugs 2x10  Planking 4x10 sec  Open books x10 bilateral             Patient Education and Home Exercises     Home Exercises Provided and Patient Education Provided     Education provided:   - HEP provided and reviewed  - Anatomy and Physiology pertaining to current condition  - Possible response to exercise  - Importance of PT compliance    Written Home Exercises Provided: yes.  Exercises were reviewed and Shay was able to demonstrate them prior to the end of the session.  Shay demonstrated good  understanding of the education provided.      See EMR under Patient Instructions for exercises provided  2/21/2022.    ASSESSMENT     Upon assessment, pt demonstrates improved core strength, LE flexibility, posture, and lumbar ROM. There are slight limitations that remain in right hamstring length and lumbar flexion. Radicular symptoms into RLE, stiffness in lumbar spine, and sitting tolerance have all improved since beginning PT. He would benefit from continuance of PT to address remaining deficits and begin transition to updated HEP.      Shay Is progressing well towards his goals.   Pt prognosis is Excellent.     Pt will continue to benefit from skilled outpatient physical therapy to address the deficits listed in the problem list box on initial evaluation, provide pt/family education and to maximize pt's level of independence in the home and community environment.     Pt's spiritual, cultural and educational needs considered and pt agreeable to plan of care and goals.    Anticipated barriers to physical therapy: none    Goals:  Short Term Goals: 3 weeks   1) Pt will be I with established HEP. (met)  2) Right LE pain will be resolved to allow for improved ease of ADL and exercise related activity . (met)  3) Lumbar pain will be no worse then 3/10. (met)     Long Term Goals: 6 weeks   1) Pt will return to exercise activities without limitations related to lumbar pain. (progressing, not met)  2) Pt will perform all ADL activities without lumbar pain. (met)  3) Pt will have 10% or less limitation on the FOTO. (progressing, not met)    PLAN     Continue with PT POC to address functional limitations and symptoms presented. DC in 1-2 weeks.     Jim Banks, PT DPT

## 2022-03-28 ENCOUNTER — CLINICAL SUPPORT (OUTPATIENT)
Dept: REHABILITATION | Facility: HOSPITAL | Age: 32
End: 2022-03-28
Payer: COMMERCIAL

## 2022-03-28 DIAGNOSIS — M79.604 LUMBAR PAIN WITH RADIATION DOWN RIGHT LEG: Primary | ICD-10-CM

## 2022-03-28 DIAGNOSIS — M54.50 LUMBAR PAIN WITH RADIATION DOWN RIGHT LEG: Primary | ICD-10-CM

## 2022-03-28 PROCEDURE — 97110 THERAPEUTIC EXERCISES: CPT | Mod: PN

## 2022-03-28 NOTE — PROGRESS NOTES
[facility]   Physical Therapy Treatment Note    Name: Shay Correa  Clinic Number: [id number]    Therapy Diagnosis:   Encounter Diagnosis   Name Primary?    Lumbar pain with radiation down right leg Yes     Physician: Cezar Almonte MD    Visit Date: 3/28/2022    Physician Orders: PT Eval and Treat   Medical Diagnosis from Referral: Lumbar disc herniation with radiculopathy  Evaluation Date: 2/21/2022  Authorization Period Expiration: 12/31/2022  Plan of Care Expiration: 04/05/22  Progress Note Due: 04/23/22  Visit # / Visits authorized: 10/20 auth (1/1 eval)   FOTO: 2/ 3      Precautions: Standard    PTA Visit #: 0/5     Time In: 0433  Time Out: 0515  Total Billable Time: 42 minutes    SUBJECTIVE     Pt reports: he is doing well with no new symptoms at this time.      He was compliant with home exercise program.  Response to previous treatment: reduced symptoms  Functional change: increased mobility and strength     Pain: 0/10  Location: right back and right leg    OBJECTIVE       Objective Measures updated at progress report unless specified.     Treatment     Shay received the treatments listed below:      Shay received therapeutic exercises to develop strength, endurance, ROM, flexibility, posture and core stabilization for 42 minutes including:    Upright bike 5 min  Bilateral shuttle 3.5 bands x30  Single leg shuttle 2.5 x20 each  Shuttle hip extension 0.5 band x20 each  Long sitting hamstring stretch 2 min  Cable rows 60# x20  Straight arm lat pulldown 3x10 23#  Seated lat pulldown 3x10 30#  Pallof press with cable 13# x20 each  Standing lumbar extension, against table x20 reps  QL alternate arm/leg with cable 3#  Bird dog x20  LE dead bugs 2x10  Planking 4x10 sec  Open books x10 bilateral          Patient Education and Home Exercises     Home Exercises Provided and Patient Education Provided     Education provided:   - HEP provided and reviewed  - Anatomy and  Physiology pertaining to current condition  - Possible response to exercise  - Importance of PT compliance    Written Home Exercises Provided: yes.  Exercises were reviewed and Shay was able to demonstrate them prior to the end of the session.  Shay demonstrated good  understanding of the education provided.      See EMR under Patient Instructions for exercises provided 2/21/2022.    ASSESSMENT     Pt tolerates therex with proper form and no significant fatigue. Pt planning to DC after last scheduled visit this week.      Shay Is progressing well towards his goals.   Pt prognosis is Excellent.     Pt will continue to benefit from skilled outpatient physical therapy to address the deficits listed in the problem list box on initial evaluation, provide pt/family education and to maximize pt's level of independence in the home and community environment.     Pt's spiritual, cultural and educational needs considered and pt agreeable to plan of care and goals.    Anticipated barriers to physical therapy: none    Goals:  Short Term Goals: 3 weeks   1) Pt will be I with established HEP. (met)  2) Right LE pain will be resolved to allow for improved ease of ADL and exercise related activity . (met)  3) Lumbar pain will be no worse then 3/10. (met)     Long Term Goals: 6 weeks   1) Pt will return to exercise activities without limitations related to lumbar pain. (met)  2) Pt will perform all ADL activities without lumbar pain. (met)  3) Pt will have 10% or less limitation on the FOTO. (progressing, not met)    PLAN     DC next visit.     Jim Banks, PT DPT